# Patient Record
Sex: MALE | Race: WHITE | NOT HISPANIC OR LATINO | Employment: STUDENT | URBAN - METROPOLITAN AREA
[De-identification: names, ages, dates, MRNs, and addresses within clinical notes are randomized per-mention and may not be internally consistent; named-entity substitution may affect disease eponyms.]

---

## 2017-10-12 ENCOUNTER — OFFICE VISIT (OUTPATIENT)
Dept: FAMILY MEDICINE | Facility: CLINIC | Age: 22
End: 2017-10-12
Payer: COMMERCIAL

## 2017-10-12 VITALS
DIASTOLIC BLOOD PRESSURE: 78 MMHG | HEIGHT: 70 IN | OXYGEN SATURATION: 98 % | HEART RATE: 70 BPM | BODY MASS INDEX: 26.26 KG/M2 | WEIGHT: 183.44 LBS | SYSTOLIC BLOOD PRESSURE: 118 MMHG | RESPIRATION RATE: 18 BRPM

## 2017-10-12 DIAGNOSIS — F98.8 ATTENTION DEFICIT DISORDER, UNSPECIFIED HYPERACTIVITY PRESENCE: ICD-10-CM

## 2017-10-12 DIAGNOSIS — Z00.00 ROUTINE PHYSICAL EXAMINATION: Primary | ICD-10-CM

## 2017-10-12 PROCEDURE — 99999 PR PBB SHADOW E&M-EST. PATIENT-LVL III: CPT | Mod: PBBFAC,,, | Performed by: INTERNAL MEDICINE

## 2017-10-12 PROCEDURE — 99385 PREV VISIT NEW AGE 18-39: CPT | Mod: S$GLB,,, | Performed by: INTERNAL MEDICINE

## 2017-10-12 RX ORDER — DEXTROAMPHETAMINE SACCHARATE, AMPHETAMINE ASPARTATE, DEXTROAMPHETAMINE SULFATE AND AMPHETAMINE SULFATE 7.5; 7.5; 7.5; 7.5 MG/1; MG/1; MG/1; MG/1
30 TABLET ORAL DAILY
Qty: 30 TABLET | Refills: 0 | Status: SHIPPED | OUTPATIENT
Start: 2017-10-12 | End: 2017-12-06

## 2017-10-12 NOTE — PROGRESS NOTES
Subjective:       Patient ID: Denver Barnett Jr. is a 22 y.o. male.    Chief Complaint: Annual Exam    Here for routine health maintenance.  No new complaints.  ADD- Dr by PCP at 21yo.  Grades were good.  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  [place]  normal       Review of Systems   Constitutional: Negative for appetite change and fever.   HENT: Negative for nosebleeds and trouble swallowing.    Eyes: Negative for discharge and visual disturbance.   Respiratory: Negative for choking and shortness of breath.    Cardiovascular: Negative for chest pain and palpitations.   Gastrointestinal: Negative for abdominal pain, nausea and vomiting.   Musculoskeletal: Negative for arthralgias and joint swelling.   Skin: Negative for rash and wound.   Neurological: Negative for dizziness and syncope.   Psychiatric/Behavioral: Negative for confusion and dysphoric mood.       Objective:      Vitals:    10/12/17 0911   BP: 118/78   Pulse: 70   Resp: 18     Physical Exam   Constitutional: He appears well-nourished.   Eyes: Conjunctivae and EOM are normal.   Neck: Trachea normal and normal range of motion. No thyromegaly present.   Cardiovascular: Normal heart sounds.    Edema negative   Pulmonary/Chest: Effort normal and breath sounds normal.   Abdominal: Soft. There is no hepatomegaly.   Musculoskeletal:   ROM normal bilateral  Strength normal bilateral   Neurological: He has normal reflexes. No cranial nerve deficit.   Skin: Skin is warm, dry and intact.   Psychiatric: He has a normal mood and affect.   Alert and Oriented    Vitals reviewed.        Assessment:       1. Routine physical examination    2. Attention deficit disorder, unspecified hyperactivity presence        Plan:       Routine physical examination    Attention deficit disorder, unspecified hyperactivity presence  -     dextroamphetamine-amphetamine (ADDERALL) 30 mg Tab; Take 30 mg by mouth once daily.  Dispense: 30 tablet; Refill: 0    wellness reviewed  Psych   "for formal testing given before continue refilling beyond this month        Counseled on regular exercise, maintenance of a healthy weight, balanced diet rich in fruits/vegetables and lean protein, and avoidance of unhealthy habits like smoking and excessive alcohol intake.   Also, counseled on importance of being compliant with medication, health appointments, diet and exercise.     No Follow-up on file. rtc w testing    "This note will not be shared with the patient."  "

## 2019-09-25 PROBLEM — G47.30 SLEEP APNEA: Status: ACTIVE | Noted: 2019-09-25

## 2021-03-30 PROBLEM — Z00.00 WELL ADULT EXAM: Status: ACTIVE | Noted: 2021-03-30

## 2021-07-05 PROBLEM — Z00.00 WELL ADULT EXAM: Status: RESOLVED | Noted: 2021-03-30 | Resolved: 2021-07-05

## 2022-04-12 PROBLEM — Z00.00 ANNUAL PHYSICAL EXAM: Status: ACTIVE | Noted: 2022-04-12

## 2022-07-18 PROBLEM — Z00.00 ANNUAL PHYSICAL EXAM: Status: RESOLVED | Noted: 2022-04-12 | Resolved: 2022-07-18

## 2023-06-05 PROBLEM — Z00.00 ANNUAL PHYSICAL EXAM: Status: RESOLVED | Noted: 2022-04-12 | Resolved: 2023-06-05
